# Patient Record
(demographics unavailable — no encounter records)

---

## 2019-06-23 NOTE — EMERGENCY DEPARTMENT REPORT
ED Back Pain/Injury HPI





- General


Chief Complaint: Fall


Stated Complaint: FALL/L SIDE PAIN


Time Seen by Provider: 19 17:00


Source: patient


Limitations: No Limitations





- History of Present Illness


Initial Comments: 


 pt states that three days ago she slipped and fell states she was taking her 

dog out has lower back pain 7/10 no numbness or weakness no bowel/bladder 

incontinence , pmhx of osteoporosis, DM 








MD Complaint: back pain


Onset/Timing: 3


-: days(s)


Similar Symptoms Previously: Yes


Radiation: left leg


Severity: moderate


Severity scale (0 -10): 8


Quality: aching


Consistency: constant


Improves With: none


Worsens With: movement, sitting upright, walking


Context: fall


Associated Symptoms: denies: numbness, difficulty walking, difficulty urinating,

incontinence, fever/chills, constipation, nausea/vomiting





- Related Data


                                  Previous Rx's











 Medication  Instructions  Recorded  Last Taken  Type


 


Cyclobenzaprine [Flexeril] 10 mg PO TID PRN #30 tablet 19 Unknown Rx


 


Menthol/Camphor [Tiger Balm 1 applicatio TP QID PRN #1 tube 19 Unknown Rx





Ointment]    


 


Naproxen [Naprosyn] 500 mg PO BID PRN #30 tablet 19 Unknown Rx











                                    Allergies











Allergy/AdvReac Type Severity Reaction Status Date / Time


 


No Known Allergies Allergy   Unverified 19 16:36














ED Review of Systems


ROS: 


Stated complaint: FALL/L SIDE PAIN


Other details as noted in HPI





Constitutional: denies: chills, fever


Eyes: denies: eye pain, eye discharge, vision change


ENT: denies: ear pain, throat pain


Respiratory: denies: cough, shortness of breath, wheezing


Cardiovascular: denies: chest pain, palpitations


Endocrine: no symptoms reported


Gastrointestinal: denies: abdominal pain, nausea, diarrhea


Genitourinary: denies: urgency, dysuria, discharge


Musculoskeletal: back pain, arthralgia, myalgia.  denies: joint swelling


Skin: denies: rash, lesions


Neurological: denies: headache, weakness, paresthesias


Psychiatric: denies: anxiety, depression


Hematological/Lymphatic: denies: easy bleeding, easy bruising





ED Past Medical Hx





- Past Medical History


Previous Medical History?: Yes


Hx Diabetes: Yes


Additional medical history: Osteoporosis





- Surgical History


Past Surgical History?: Yes


Additional Surgical History: Tubaligation





- Social History


Smoking Status: Former Smoker





- Medications


Home Medications: 


                                Home Medications











 Medication  Instructions  Recorded  Confirmed  Last Taken  Type


 


Cyclobenzaprine [Flexeril] 10 mg PO TID PRN #30 tablet 19  Unknown Rx


 


Menthol/Camphor [Tiger Balm 1 applicatio TP QID PRN #1 tube 19  Unknown Rx





Ointment]     


 


Naproxen [Naprosyn] 500 mg PO BID PRN #30 tablet 19  Unknown Rx














ED Physical Exam





- General


Limitations: No Limitations


General appearance: alert, in no apparent distress





- Head


Head exam: Present: atraumatic, normocephalic





- Eye


Eye exam: Present: normal appearance, PERRL, EOMI


Pupils: Present: normal accommodation





- ENT


ENT exam: Present: normal orophraynx, mucous membranes moist, TM's normal 

bilaterally, normal external ear exam





- Neck


Neck exam: Present: normal inspection, full ROM.  Absent: tenderness, 

lymphadenopathy, thyromegaly





- Expanded Neck Exam


  ** Expanded


Neck exam: Absent: tenderness, midline deformity, anterior neck swelling, thyroi

d mass, carotid bruit, tracheal deviation





- Respiratory


Respiratory exam: Present: normal lung sounds bilaterally.  Absent: respiratory 

distress, wheezes, stridor, chest wall tenderness





- Cardiovascular


Cardiovascular Exam: Present: regular rate, normal rhythm, normal heart sounds. 

 Absent: systolic murmur, diastolic murmur, rubs, gallop





- GI/Abdominal


GI/Abdominal exam: Present: soft, normal bowel sounds.  Absent: distended, 

tenderness, guarding, rebound, rigid, bruit, hernia





- Rectal


Rectal exam: Present: deferred





- Extremities Exam


Extremities exam: Present: normal inspection, full ROM, normal capillary refill.

  Absent: tenderness, pedal edema, joint swelling, calf tenderness





- Back Exam


Back exam: Present: normal inspection, tenderness, muscle spasm, paraspinal 

tenderness.  Absent: full ROM, CVA tenderness (R), CVA tenderness (L), vertebral

 tenderness (no posterior vertebral point tenderness no mild paraspinus muscle 

tendernesss to deep palpation pos straight leg left  ), rash noted





- Expanded Back Exam


  ** Expanded


Back exam: Absent: saddle anesthesia


Back exam: Sciatic Notch Tenderness: Left, Positive Straight Leg Raise: Left, 

Negative Straight Leg Raising: Right





- Neurological Exam


Neurological exam: Present: alert, oriented X3, CN II-XII intact, normal gait, 

reflexes normal.  Absent: motor sensory deficit





- Expanded Neurological Exam


  ** Expanded


Patient oriented to: Present: person, place, time


Speech: Present: fluid speech


Cranial nerves: EOM's Intact: Normal, Gag Reflex: Normal, Tongue Deviation: 

Normal, Nystagmus: Normal, Facial Sensation: Normal


Cerebellar function: Finger to Nose: Normal, Heel to Shin: Normal, Romberg: 

Normal


Upper motor neuron: Peña Neglect: Normal, Pronator Drift: Normal, Babinski Sign:

 Normal, Sensory Extinction: Normal


Sensory exam: Lower Extremity Light Touch: Normal, Lower Extremity Pin Prick: 

Normal, Lower Extremity Temperature: Normal, LE 2 Point Discrimination: Normal


Motor strength exam: RUE: 5, LUE: 5, RLE: 5, LLE: 5


DTR: bicep (R): 2+, bicep (L): 2+, ankle (R): 2+, ankle (L): 2+


Best Eye Response ([place]): (4) open spontaneously


Best Motor Response ([place]): (6) obeys commands


Best Verbal Response (Y[place]): (5) oriented


Yehuda Total: 15





- Psychiatric


Psychiatric exam: Present: normal affect, normal mood





- Skin


Skin exam: Present: warm, dry, intact, normal color.  Absent: rash





ED Course


                                   Vital Signs











  19





  16:36


 


Temperature 98.8 F


 


Pulse Rate 100 H


 


Respiratory 20





Rate 


 


Blood Pressure 139/82


 


O2 Sat by Pulse 96





Oximetry 














ED Medical Decision Making





- Radiology Data


Radiology results: report reviewed, image reviewed


 


Patient: OLIVE WOLFE MR#: [id number] 


120 


: 1958 Acct:[id number] 





Age/Sex: 60 / F ADM Date: 19 





Loc: ED 


Attending Dr: 








Ordering Physician: WYATT GALEANA 


Date of Service: 19 


Procedure(s): XR spine lumbosacral 2-3V 


Accession Number(s): [id number] 





cc: WYATT GALEANA 





Fluoro Time In Minutes: 





PROCEDURE: XR SPINE LUMBOSACRAL 2-3V 





HISTORY: fall, lower back pain 





FINDINGS: AP and lateral views of the lumbar spine were acquired as well as 

coned-down lateral view


of L5-S1. No fracture is seen in the lumbar spine. There is anterior endplate 

remodeling at L1-L2, 


L3-L4 and L4-L5. 





IMPRESSION: No fracture is seen in the lumbar spine 





This document is electronically signed by Arnoldo Chávez MD., 2019 

06:15:34 PM ET 





Transcribed By: BEBA 


Dictated By: ARNOLDO CHÁVEZ MD 


Electronically Authenticated By: ARNOLDO CHÁVEZ MD 


Signed Date/Time: 19 











DD/DT: 19 


TD/TT: 19








- Medical Decision Making


 xray negative no fracture no soft tissue abnormality pt is currently ambulatory

 with steady gait will follow up with pcp in 2-3 days. pt verbalized agreement 

and understanding of same. 





Critical care attestation.: 


If time is entered above; I have spent that time in minutes in the direct care 

of this critically ill patient, excluding procedure time.








ED Disposition


Clinical Impression: 


Low back strain


Qualifiers:


 Encounter type: initial encounter Qualified Code(s): S39.012A - Strain of 

muscle, fascia and tendon of lower back, initial encounter





Chronic low back pain


Qualifiers:


 Back pain laterality: left Sciatica presence: with sciatica Sciatica 

laterality: sciatica of left side Qualified Code(s): M54.42 - Lumbago with 

sciatica, left side; G89.29 - Other chronic pain





Disposition: - TO HOME OR SELFCARE


Is pt being admited?: No


Does the pt Need Aspirin: No


Condition: Stable


Instructions:  Low Back Strain (ED), Core Strengthening Exercises (GEN)


Prescriptions: 


Cyclobenzaprine [Flexeril] 10 mg PO TID PRN #30 tablet


 PRN Reason: Muscle Spasm


Naproxen [Naprosyn] 500 mg PO BID PRN #30 tablet


 PRN Reason: pain


Menthol/Camphor [Tiger Balm Ointment] 1 applicatio TP QID PRN #1 tube


 PRN Reason: pain


Referrals: 


[facility] [Outside] - 3-5 Days


Forms:  Work/School Release Form(ED)


Time of Disposition: 19:51

## 2019-06-23 NOTE — XRAY REPORT
PROCEDURE: XR SPINE LUMBOSACRAL 2-3V 

 

HISTORY: fall, lower back pain 

 

FINDINGS: AP and lateral views of the lumbar spine were acquired as well as coned-down lateral view o
f L5-S1. No fracture is seen in the lumbar spine. There is anterior endplate remodeling at L1-L2, L3-
L4 and L4-L5. 

 

IMPRESSION: No fracture is seen in the lumbar spine 

 

This document is electronically signed by Arnoldo Chávez MD., June 23 2019 06:15:34 PM ET